# Patient Record
Sex: MALE | Race: WHITE | NOT HISPANIC OR LATINO | Employment: FULL TIME | URBAN - METROPOLITAN AREA
[De-identification: names, ages, dates, MRNs, and addresses within clinical notes are randomized per-mention and may not be internally consistent; named-entity substitution may affect disease eponyms.]

---

## 2017-11-12 ENCOUNTER — OFFICE VISIT (OUTPATIENT)
Dept: URGENT CARE | Facility: PHYSICIAN GROUP | Age: 22
End: 2017-11-12
Payer: COMMERCIAL

## 2017-11-12 VITALS
HEIGHT: 70 IN | HEART RATE: 95 BPM | TEMPERATURE: 99.3 F | RESPIRATION RATE: 16 BRPM | SYSTOLIC BLOOD PRESSURE: 146 MMHG | WEIGHT: 299 LBS | DIASTOLIC BLOOD PRESSURE: 84 MMHG | BODY MASS INDEX: 42.8 KG/M2 | OXYGEN SATURATION: 96 %

## 2017-11-12 DIAGNOSIS — J06.9 UPPER RESPIRATORY TRACT INFECTION, UNSPECIFIED TYPE: ICD-10-CM

## 2017-11-12 PROCEDURE — 99204 OFFICE O/P NEW MOD 45 MIN: CPT | Performed by: PHYSICIAN ASSISTANT

## 2017-11-12 RX ORDER — NAPROXEN 500 MG/1
TABLET ORAL
Qty: 30 TAB | Refills: 0 | Status: SHIPPED | OUTPATIENT
Start: 2017-11-12

## 2017-11-12 RX ORDER — DEXTROMETHORPHAN HYDROBROMIDE AND PROMETHAZINE HYDROCHLORIDE 15; 6.25 MG/5ML; MG/5ML
SYRUP ORAL
Qty: 180 ML | Refills: 0 | Status: SHIPPED | OUTPATIENT
Start: 2017-11-12

## 2017-11-12 RX ORDER — LISINOPRIL 20 MG/1
20 TABLET ORAL DAILY
COMMUNITY

## 2017-11-12 ASSESSMENT — ENCOUNTER SYMPTOMS
SHORTNESS OF BREATH: 0
WHEEZING: 0
CHILLS: 0
SWEATS: 0
HEADACHES: 1
SORE THROAT: 1
COUGH: 1
MYALGIAS: 0
PSYCHIATRIC NEGATIVE: 1
RHINORRHEA: 1
FEVER: 0
HEARTBURN: 0

## 2017-11-12 NOTE — PROGRESS NOTES
"Subjective:      Jose Luis Rojas is a 22 y.o. male who presents with Cough (x 2 weeks, congestion, sore throat x 4 days)            Cough   This is a new problem. The current episode started in the past 7 days. The problem has been unchanged. The problem occurs every few hours. The cough is productive of sputum. Associated symptoms include ear congestion, headaches, nasal congestion, postnasal drip, rhinorrhea and a sore throat. Pertinent negatives include no chest pain, chills, ear pain, fever, heartburn, myalgias, shortness of breath, sweats or wheezing. Nothing aggravates the symptoms. Risk factors for lung disease include smoking/tobacco exposure. He has tried OTC cough suppressant for the symptoms. The treatment provided mild relief. There is no history of asthma.     Past medical history: Is not pertinent to this examination  Past surgical history: Not pertinent to this examination  Family history: Is not pertinent to this examination  Allergies: No known drug allergies  Social history: Is reviewed in Epic    Review of Systems   Constitutional: Negative for chills and fever.   HENT: Positive for postnasal drip, rhinorrhea and sore throat. Negative for ear pain.    Respiratory: Positive for cough. Negative for shortness of breath and wheezing.    Cardiovascular: Negative for chest pain.   Gastrointestinal: Negative for heartburn.   Genitourinary: Negative.    Musculoskeletal: Negative for myalgias.   Skin: Negative.    Neurological: Positive for headaches.   Psychiatric/Behavioral: Negative.           Objective:     /84   Pulse 95   Temp 37.4 °C (99.3 °F)   Resp 16   Ht 1.778 m (5' 10\")   Wt (!) 135.6 kg (299 lb)   SpO2 96%   BMI 42.90 kg/m²      Physical Exam       Gen.: Patient is A and O ×3, no acute distress, well-nourished well-hydrated  Vitals: Are listed and unremarkable  HEENT: Heads normocephalic, atraumatic, PERRLA, extraocular movements intact, TMs and oropharynx clear  Neck: Soft " supple without cervical lymphadenopathy  Cardiovascular: Regular rate and rhythm normal S1 and S2. No murmurs, rubs or gallops  Lungs are clear to auscultation bilaterally. no wheezes rales or rhonchi  Abdomen is soft, nontender, nondistended with good bowel sounds, no hepatosplenomegaly  Skin: Is well perfused without evidence of rash or lesions  Neurological:  cranial nerves II through XII were assessed and intact.  Musculoskeletal: Full range of motion, 5 out of 5 strength against resistance  Neurovascularly: Intact with a 2 second cap refill, good distal pulses       Assessment/Plan:     1. Upper respiratory tract infection, unspecified type  Discussed likely viral etiology at length, splint care measures discussed. Offered a note for work. He defers. Follow-up if symptoms persist or worsen symmetric  - promethazine-dextromethorphan (PROMETHAZINE-DM) 6.25-15 MG/5ML syrup; Take 5mls every six hours as needed for cough  Dispense: 180 mL; Refill: 0  - naproxen (NAPROSYN) 500 MG Tab; Take one pill twice a days as needed with food  Dispense: 30 Tab; Refill: 0

## 2018-11-06 ENCOUNTER — OFFICE VISIT (OUTPATIENT)
Dept: URGENT CARE | Facility: PHYSICIAN GROUP | Age: 23
End: 2018-11-06
Payer: COMMERCIAL

## 2018-11-06 VITALS
TEMPERATURE: 98.2 F | WEIGHT: 281 LBS | SYSTOLIC BLOOD PRESSURE: 138 MMHG | BODY MASS INDEX: 40.23 KG/M2 | HEART RATE: 89 BPM | DIASTOLIC BLOOD PRESSURE: 92 MMHG | RESPIRATION RATE: 16 BRPM | OXYGEN SATURATION: 99 % | HEIGHT: 70 IN

## 2018-11-06 DIAGNOSIS — J06.9 UPPER RESPIRATORY TRACT INFECTION, UNSPECIFIED TYPE: ICD-10-CM

## 2018-11-06 PROCEDURE — 99213 OFFICE O/P EST LOW 20 MIN: CPT | Performed by: EMERGENCY MEDICINE

## 2018-11-06 RX ORDER — BENZONATATE 100 MG/1
100 CAPSULE ORAL 3 TIMES DAILY PRN
Qty: 60 CAP | Refills: 0 | Status: SHIPPED | OUTPATIENT
Start: 2018-11-06 | End: 2018-11-16

## 2018-11-06 ASSESSMENT — ENCOUNTER SYMPTOMS
COUGH: 1
SINUS PAIN: 1
EYE DISCHARGE: 0
EYE REDNESS: 0
MYALGIAS: 0
NAUSEA: 0
SPUTUM PRODUCTION: 1
WHEEZING: 0
PALPITATIONS: 0
SHORTNESS OF BREATH: 0
ABDOMINAL PAIN: 0
STRIDOR: 0
SENSORY CHANGE: 0
VOMITING: 0
DIARRHEA: 0
FEVER: 0
SORE THROAT: 0
CHILLS: 0

## 2018-11-06 NOTE — LETTER
November 6, 2018        Jose Luis Rojas  [address] [facility] [address]        Dear Jose Luis:    Please ask for the next 1-2 days of from work for medical reasons.    If you have any questions or concerns, please don't hesitate to call.        Sincerely,        Jude Farris M.D.    Electronically Signed

## 2018-11-06 NOTE — PROGRESS NOTES
"Subjective:      Jose Luis Rojas is a 23 y.o. male who presents with Cough (x1week )            HPI  Pt with a cough for the past month , no fever, chills  NVD. Pt has post nasal drainage foir the past few days is requesting 1-2 days off from work and a cough supressant.     PMH:  has no past medical history on file.  MEDS:   Current Outpatient Prescriptions:   •  lisinopril (PRINIVIL) 20 MG Tab, Take 20 mg by mouth every day., Disp: , Rfl:   •  promethazine-dextromethorphan (PROMETHAZINE-DM) 6.25-15 MG/5ML syrup, Take 5mls every six hours as needed for cough (Patient not taking: Reported on 11/6/2018), Disp: 180 mL, Rfl: 0  •  naproxen (NAPROSYN) 500 MG Tab, Take one pill twice a days as needed with food (Patient not taking: Reported on 11/6/2018), Disp: 30 Tab, Rfl: 0  ALLERGIES:   Allergies   Allergen Reactions   • Pcn [Penicillins]      As a child     SURGHX: History reviewed. No pertinent surgical history.  SOCHX:  reports that he has never smoked. He has never used smokeless tobacco. He reports that he does not drink alcohol.  FH: family history is not on file.  Review of Systems   Constitutional: Negative for chills and fever.   HENT: Positive for congestion and sinus pain. Negative for sore throat.    Eyes: Negative for discharge and redness.   Respiratory: Positive for cough and sputum production. Negative for shortness of breath, wheezing and stridor.    Cardiovascular: Negative for chest pain and palpitations.   Gastrointestinal: Negative for abdominal pain, diarrhea, nausea and vomiting.   Genitourinary: Negative.    Musculoskeletal: Negative for myalgias.   Skin: Negative for rash.   Neurological: Negative for sensory change.          Objective:     /92   Pulse 89   Temp 36.8 °C (98.2 °F) (Temporal)   Resp 16   Ht 1.778 m (5' 10\")   Wt (!) 127.5 kg (281 lb)   SpO2 99%   BMI 40.32 kg/m²      Physical Exam   Constitutional: He appears well-developed and well-nourished. No distress.   obese "   HENT:   Head: Normocephalic and atraumatic.   Right Ear: External ear normal.   Left Ear: External ear normal.   Post nasal drainage   Eyes: Right eye exhibits no discharge.   Cardiovascular: Normal rate.    Pulmonary/Chest: Effort normal and breath sounds normal.   Abdominal: He exhibits no distension. There is no tenderness.   Musculoskeletal: Normal range of motion. He exhibits no edema or tenderness.   Neurological: He is alert. Coordination normal.   Skin: Skin is warm. No rash noted.   Psychiatric: He has a normal mood and affect.   Vitals reviewed.              Assessment/Plan:     DX Bronchitis  Sinusitis       I am recommending the patient initiate/ continue hydration efforts including the use of a vaporizer/humidifier/ netti pot. I also recommend the pt, initiate Mucinex and Sudafed or Dayquil if not hypertensive. In addition the patient will initiate the prescribed prescription medication/s: Tessalon perles for the night. If the patient's condition exacerbates with worsening dysphagia, shortness of breath, uncontrolled fever, headache or chest pressure he/she will return immediately to the urgent care or go to  the emergency department for further evaluation.  Off work note for the next 1-2 days for medical reasons.  Jude Farris  .

## 2019-08-17 ENCOUNTER — HOSPITAL ENCOUNTER (OUTPATIENT)
Dept: RADIOLOGY | Facility: MEDICAL CENTER | Age: 24
End: 2019-08-17
Attending: NURSE PRACTITIONER
Payer: COMMERCIAL

## 2019-08-17 ENCOUNTER — OFFICE VISIT (OUTPATIENT)
Dept: URGENT CARE | Facility: PHYSICIAN GROUP | Age: 24
End: 2019-08-17
Payer: COMMERCIAL

## 2019-08-17 VITALS
TEMPERATURE: 97.7 F | HEIGHT: 70 IN | SYSTOLIC BLOOD PRESSURE: 152 MMHG | HEART RATE: 66 BPM | BODY MASS INDEX: 42.52 KG/M2 | RESPIRATION RATE: 15 BRPM | WEIGHT: 297 LBS | DIASTOLIC BLOOD PRESSURE: 104 MMHG | OXYGEN SATURATION: 98 %

## 2019-08-17 DIAGNOSIS — M79.672 FOOT PAIN, BILATERAL: ICD-10-CM

## 2019-08-17 DIAGNOSIS — M79.671 FOOT PAIN, BILATERAL: ICD-10-CM

## 2019-08-17 DIAGNOSIS — S96.911A RIGHT FOOT STRAIN, INITIAL ENCOUNTER: ICD-10-CM

## 2019-08-17 DIAGNOSIS — S96.912A STRAIN OF LEFT FOOT, INITIAL ENCOUNTER: ICD-10-CM

## 2019-08-17 PROCEDURE — 73630 X-RAY EXAM OF FOOT: CPT | Mod: RT

## 2019-08-17 PROCEDURE — 99214 OFFICE O/P EST MOD 30 MIN: CPT | Performed by: NURSE PRACTITIONER

## 2019-08-17 PROCEDURE — 73630 X-RAY EXAM OF FOOT: CPT | Mod: LT

## 2019-08-17 RX ORDER — IBUPROFEN 200 MG
200 TABLET ORAL EVERY 6 HOURS PRN
COMMUNITY

## 2019-08-17 ASSESSMENT — ENCOUNTER SYMPTOMS
NUMBNESS: 0
CONSTITUTIONAL NEGATIVE: 1
WEAKNESS: 0
NEUROLOGICAL NEGATIVE: 1
SENSORY CHANGE: 0
TINGLING: 0

## 2019-08-17 ASSESSMENT — PAIN SCALES - GENERAL: PAINLEVEL: 5=MODERATE PAIN

## 2019-08-17 NOTE — PROGRESS NOTES
Subjective:     Jose Luis Rojas is a 24 y.o. male who presents for Foot Problem (Bilateral foot pain x 1 -2 days)       Foot Problem   This is a new problem. The problem has been gradually worsening. Pertinent negatives include no numbness, rash or weakness.     Patient presents urgent care with reports of bilateral foot pain.  He states that 2 days ago he started to experience the top of his right foot 1 day after tripping on it. He states he was compensating by walking more in his left foot and now is also starting to feel similar pain at the left.  Reports a history of foot injury but was never evaluated.  Patient showed a picture where his right foot was swollen and bruised in the past.  Patient reports that injury healed without incident.  Prior therapy: Ibuprofen with no relief in the pain. Risk factors: Patient is on his feet a lot.    PMH:  has no past medical history on file.    MEDS:   Current Outpatient Medications:   •  ibuprofen (MOTRIN) 200 MG Tab, Take 200 mg by mouth every 6 hours as needed., Disp: , Rfl:   •  lisinopril (PRINIVIL) 20 MG Tab, Take 20 mg by mouth every day., Disp: , Rfl:   •  promethazine-dextromethorphan (PROMETHAZINE-DM) 6.25-15 MG/5ML syrup, Take 5mls every six hours as needed for cough (Patient not taking: Reported on 11/6/2018), Disp: 180 mL, Rfl: 0  •  naproxen (NAPROSYN) 500 MG Tab, Take one pill twice a days as needed with food (Patient not taking: Reported on 11/6/2018), Disp: 30 Tab, Rfl: 0    ALLERGIES:   Allergies   Allergen Reactions   • Pcn [Penicillins]      As a child     SURGHX: History reviewed. No pertinent surgical history.    SOCHX:  reports that he has never smoked. He has never used smokeless tobacco. He reports that he does not drink alcohol.     FH: Reviewed with patient, not pertinent to this visit.    Review of Systems   Constitutional: Negative.    Musculoskeletal:        Foot pain   Skin: Negative.  Negative for rash.   Neurological: Negative.  Negative  "for tingling, sensory change, weakness and numbness.   All other systems reviewed and are negative.    Objective:     /104   Pulse 66   Temp 36.5 °C (97.7 °F)   Resp 15   Ht 1.778 m (5' 10\")   Wt (!) 134.7 kg (297 lb)   SpO2 98%   BMI 42.62 kg/m²     Physical Exam   Constitutional: He is oriented to person, place, and time. He appears well-developed and well-nourished. He is cooperative.  Non-toxic appearance. No distress.   HENT:   Head: Normocephalic.   Right Ear: External ear normal.   Left Ear: External ear normal.   Nose: Nose normal.   Eyes: Conjunctivae and EOM are normal.   Neck: Normal range of motion.   Cardiovascular: Normal rate and intact distal pulses.   Pulses:       Dorsalis pedis pulses are 2+ on the right side, and 2+ on the left side.   Pulmonary/Chest: Effort normal. No respiratory distress.   Musculoskeletal: Normal range of motion. He exhibits no deformity.        Right foot: There is tenderness (Dorsal). There is normal range of motion, no swelling, normal capillary refill, no deformity and no laceration.        Left foot: There is tenderness (Dorsal). There is normal range of motion, no swelling, normal capillary refill, no deformity and no laceration.   Neurological: He is alert and oriented to person, place, and time. He has normal strength. No sensory deficit. Coordination normal.   Skin: Skin is warm and dry. Capillary refill takes less than 2 seconds. He is not diaphoretic. No pallor.   Psychiatric: He has a normal mood and affect. His behavior is normal.   Vitals reviewed.    X-ray of left foot:    Details     Reading Physician Reading Date Result Priority   John Mensah M.D. 8/17/2019 [facility]      Narrative       8/17/2019 12:07 PM    HISTORY/REASON FOR EXAM:  Atraumatic Pain/Swelling/Deformity  Atraumatic dorsal left foot pain x1 day.    TECHNIQUE/EXAM DESCRIPTION AND NUMBER OF VIEWS:  3 views of the LEFT foot.    COMPARISON:  8/17/2019.    FINDINGS:    No acute " fracture or dislocation.    No joint osteoarthritis.      Impression         No acute osseous abnormality.             Last Resulted: 08/17/19 12:22 PM        X-ray of right foot:    Details     Reading Physician Reading Date Result Priority   John Mensah M.D. [facility]/2019 [facility]      Narrative       8/17/2019 12:03 PM    HISTORY/REASON FOR EXAM:  Atraumatic Pain/Swelling/Deformity  Right dorsal foot pain.    TECHNIQUE/EXAM DESCRIPTION AND NUMBER OF VIEWS:  3 views of the RIGHT foot.    COMPARISON:  None.    FINDINGS:    No acute fracture or dislocation.    No joint osteoarthritis.    Well-corticated osseous fragment adjacent to the distal fibula.      Impression       No acute osseous abnormality.             Last Resulted: 08/17/19 12:21 PM           Assessment/Plan:     1. Foot pain, bilateral  - DX-FOOT-COMPLETE 3+ LEFT; Future  - DX-FOOT-COMPLETE 3+ RIGHT; Future    2. Right foot strain, initial encounter    3. Strain of left foot, initial encounter    X-ray of left and right foot ordered. Radiology report and images reviewed by myself. Per my interpretation: negative for acute process, no fracture or dislocation.     Likely strain.    Discussed RICE and OTC ibuprofen and/or acetaminophen PRN for pain.    Patient declines referral for sports medicine at this time.    Patient advised close monitoring for resolution of symptoms and to: Return for 1) Symptoms don't improve or worsen, or go to ER, 2) Follow up with primary care in 7-10 days.    Differential diagnosis, natural history, supportive care, and indications for immediate follow-up discussed. All questions answered. Patient agrees with the plan of care.

## 2022-02-23 ENCOUNTER — HOSPITAL ENCOUNTER (OUTPATIENT)
Facility: MEDICAL CENTER | Age: 27
End: 2022-02-23
Attending: PHYSICIAN ASSISTANT
Payer: COMMERCIAL

## 2022-02-23 LAB
APPEARANCE FLD: NORMAL
BODY FLD TYPE: NORMAL
COLOR FLD: YELLOW
CRYSTALS FLD MICRO: NORMAL
GRAM STN SPEC: NORMAL
LYMPHOCYTES NFR FLD: 4 %
MONONUC CELLS NFR FLD: 7 %
NEUTROPHILS NFR FLD: 89 %
RBC # FLD: 3000 CELLS/UL
SIGNIFICANT IND 70042: NORMAL
SITE SITE: NORMAL
SOURCE SOURCE: NORMAL
WBC # FLD: NORMAL CELLS/UL

## 2022-02-23 PROCEDURE — 87075 CULTR BACTERIA EXCEPT BLOOD: CPT

## 2022-02-23 PROCEDURE — 89060 EXAM SYNOVIAL FLUID CRYSTALS: CPT

## 2022-02-23 PROCEDURE — 89051 BODY FLUID CELL COUNT: CPT

## 2022-02-23 PROCEDURE — 87070 CULTURE OTHR SPECIMN AEROBIC: CPT

## 2022-02-23 PROCEDURE — 87205 SMEAR GRAM STAIN: CPT

## 2022-02-26 LAB
BACTERIA FLD AEROBE CULT: NORMAL
GRAM STN SPEC: NORMAL
SIGNIFICANT IND 70042: NORMAL
SITE SITE: NORMAL
SOURCE SOURCE: NORMAL

## 2022-02-28 LAB
BACTERIA SPEC ANAEROBE CULT: NORMAL
SIGNIFICANT IND 70042: NORMAL
SITE SITE: NORMAL
SOURCE SOURCE: NORMAL

## 2022-11-10 ENCOUNTER — HOSPITAL ENCOUNTER (OUTPATIENT)
Dept: LAB | Facility: MEDICAL CENTER | Age: 27
End: 2022-11-10
Attending: FAMILY MEDICINE
Payer: COMMERCIAL

## 2022-11-10 LAB
25(OH)D3 SERPL-MCNC: 21 NG/ML (ref 30–100)
ALBUMIN SERPL BCP-MCNC: 4.6 G/DL (ref 3.2–4.9)
ALBUMIN/GLOB SERPL: 1.7 G/DL
ALP SERPL-CCNC: 80 U/L (ref 30–99)
ALT SERPL-CCNC: 36 U/L (ref 2–50)
ANION GAP SERPL CALC-SCNC: 12 MMOL/L (ref 7–16)
AST SERPL-CCNC: 28 U/L (ref 12–45)
BILIRUB SERPL-MCNC: 0.3 MG/DL (ref 0.1–1.5)
BUN SERPL-MCNC: 19 MG/DL (ref 8–22)
CALCIUM SERPL-MCNC: 9.3 MG/DL (ref 8.5–10.5)
CHLORIDE SERPL-SCNC: 103 MMOL/L (ref 96–112)
CO2 SERPL-SCNC: 24 MMOL/L (ref 20–33)
CREAT SERPL-MCNC: 0.92 MG/DL (ref 0.5–1.4)
CRP SERPL HS-MCNC: 0.61 MG/DL (ref 0–0.75)
ERYTHROCYTE [SEDIMENTATION RATE] IN BLOOD BY WESTERGREN METHOD: 5 MM/HOUR (ref 0–20)
EST. AVERAGE GLUCOSE BLD GHB EST-MCNC: 123 MG/DL
GFR SERPLBLD CREATININE-BSD FMLA CKD-EPI: 116 ML/MIN/1.73 M 2
GLOBULIN SER CALC-MCNC: 2.7 G/DL (ref 1.9–3.5)
GLUCOSE SERPL-MCNC: 85 MG/DL (ref 65–99)
HBA1C MFR BLD: 5.9 % (ref 4–5.6)
POTASSIUM SERPL-SCNC: 4.1 MMOL/L (ref 3.6–5.5)
PROT SERPL-MCNC: 7.3 G/DL (ref 6–8.2)
SODIUM SERPL-SCNC: 139 MMOL/L (ref 135–145)
URATE SERPL-MCNC: 10.2 MG/DL (ref 2.5–8.3)

## 2022-11-10 PROCEDURE — 84550 ASSAY OF BLOOD/URIC ACID: CPT

## 2022-11-10 PROCEDURE — 36415 COLL VENOUS BLD VENIPUNCTURE: CPT

## 2022-11-10 PROCEDURE — 85652 RBC SED RATE AUTOMATED: CPT

## 2022-11-10 PROCEDURE — 80053 COMPREHEN METABOLIC PANEL: CPT

## 2022-11-10 PROCEDURE — 83036 HEMOGLOBIN GLYCOSYLATED A1C: CPT

## 2022-11-10 PROCEDURE — 86140 C-REACTIVE PROTEIN: CPT

## 2022-11-10 PROCEDURE — 82306 VITAMIN D 25 HYDROXY: CPT

## 2022-11-10 PROCEDURE — 86038 ANTINUCLEAR ANTIBODIES: CPT

## 2022-11-11 ENCOUNTER — HOSPITAL ENCOUNTER (OUTPATIENT)
Dept: LAB | Facility: MEDICAL CENTER | Age: 27
End: 2022-11-11
Attending: FAMILY MEDICINE
Payer: COMMERCIAL

## 2022-11-11 PROCEDURE — 86812 HLA TYPING A B OR C: CPT

## 2022-11-12 LAB — NUCLEAR IGG SER QL IA: NORMAL

## 2022-11-15 LAB — HLA-B27 QL FC: NEGATIVE

## 2025-07-01 ENCOUNTER — HOSPITAL ENCOUNTER (OUTPATIENT)
Dept: LAB | Facility: MEDICAL CENTER | Age: 30
End: 2025-07-01
Attending: FAMILY MEDICINE
Payer: COMMERCIAL

## 2025-07-01 LAB
ALBUMIN SERPL BCP-MCNC: 4.4 G/DL (ref 3.2–4.9)
ALBUMIN/GLOB SERPL: 1.2 G/DL
ALP SERPL-CCNC: 86 U/L (ref 30–99)
ALT SERPL-CCNC: 27 U/L (ref 2–50)
ANION GAP SERPL CALC-SCNC: 14 MMOL/L (ref 7–16)
AST SERPL-CCNC: 26 U/L (ref 12–45)
BASOPHILS # BLD AUTO: 0.7 % (ref 0–1.8)
BASOPHILS # BLD: 0.09 K/UL (ref 0–0.12)
BILIRUB SERPL-MCNC: 0.3 MG/DL (ref 0.1–1.5)
BUN SERPL-MCNC: 15 MG/DL (ref 8–22)
CALCIUM ALBUM COR SERPL-MCNC: 9.5 MG/DL (ref 8.5–10.5)
CALCIUM SERPL-MCNC: 9.8 MG/DL (ref 8.5–10.5)
CHLORIDE SERPL-SCNC: 102 MMOL/L (ref 96–112)
CHOLEST SERPL-MCNC: 198 MG/DL (ref 100–199)
CO2 SERPL-SCNC: 21 MMOL/L (ref 20–33)
CREAT SERPL-MCNC: 0.97 MG/DL (ref 0.5–1.4)
EOSINOPHIL # BLD AUTO: 0.38 K/UL (ref 0–0.51)
EOSINOPHIL NFR BLD: 3.1 % (ref 0–6.9)
ERYTHROCYTE [DISTWIDTH] IN BLOOD BY AUTOMATED COUNT: 40.8 FL (ref 35.9–50)
EST. AVERAGE GLUCOSE BLD GHB EST-MCNC: 128 MG/DL
FASTING STATUS PATIENT QL REPORTED: NORMAL
GFR SERPLBLD CREATININE-BSD FMLA CKD-EPI: 107 ML/MIN/1.73 M 2
GLOBULIN SER CALC-MCNC: 3.6 G/DL (ref 1.9–3.5)
GLUCOSE SERPL-MCNC: 88 MG/DL (ref 65–99)
HBA1C MFR BLD: 6.1 % (ref 4–5.6)
HCT VFR BLD AUTO: 50 % (ref 42–52)
HDLC SERPL-MCNC: 45 MG/DL
HGB BLD-MCNC: 16.2 G/DL (ref 14–18)
IMM GRANULOCYTES # BLD AUTO: 0.03 K/UL (ref 0–0.11)
IMM GRANULOCYTES NFR BLD AUTO: 0.2 % (ref 0–0.9)
LDLC SERPL CALC-MCNC: 130 MG/DL
LYMPHOCYTES # BLD AUTO: 3.98 K/UL (ref 1–4.8)
LYMPHOCYTES NFR BLD: 32.4 % (ref 22–41)
MCH RBC QN AUTO: 28 PG (ref 27–33)
MCHC RBC AUTO-ENTMCNC: 32.4 G/DL (ref 32.3–36.5)
MCV RBC AUTO: 86.4 FL (ref 81.4–97.8)
MONOCYTES # BLD AUTO: 1.16 K/UL (ref 0–0.85)
MONOCYTES NFR BLD AUTO: 9.4 % (ref 0–13.4)
NEUTROPHILS # BLD AUTO: 6.65 K/UL (ref 1.82–7.42)
NEUTROPHILS NFR BLD: 54.2 % (ref 44–72)
NRBC # BLD AUTO: 0 K/UL
NRBC BLD-RTO: 0 /100 WBC (ref 0–0.2)
PLATELET # BLD AUTO: 503 K/UL (ref 164–446)
PMV BLD AUTO: 9.7 FL (ref 9–12.9)
POTASSIUM SERPL-SCNC: 4.6 MMOL/L (ref 3.6–5.5)
PROT SERPL-MCNC: 8 G/DL (ref 6–8.2)
RBC # BLD AUTO: 5.79 M/UL (ref 4.7–6.1)
SODIUM SERPL-SCNC: 137 MMOL/L (ref 135–145)
TRIGL SERPL-MCNC: 116 MG/DL (ref 0–149)
TSH SERPL-ACNC: 1.13 UIU/ML (ref 0.38–5.33)
WBC # BLD AUTO: 12.3 K/UL (ref 4.8–10.8)

## 2025-07-01 PROCEDURE — 84403 ASSAY OF TOTAL TESTOSTERONE: CPT

## 2025-07-01 PROCEDURE — 85025 COMPLETE CBC W/AUTO DIFF WBC: CPT

## 2025-07-01 PROCEDURE — 84402 ASSAY OF FREE TESTOSTERONE: CPT

## 2025-07-01 PROCEDURE — 80053 COMPREHEN METABOLIC PANEL: CPT

## 2025-07-01 PROCEDURE — 80061 LIPID PANEL: CPT

## 2025-07-01 PROCEDURE — 83036 HEMOGLOBIN GLYCOSYLATED A1C: CPT

## 2025-07-01 PROCEDURE — 84443 ASSAY THYROID STIM HORMONE: CPT

## 2025-07-01 PROCEDURE — 84270 ASSAY OF SEX HORMONE GLOBUL: CPT

## 2025-07-01 PROCEDURE — 36415 COLL VENOUS BLD VENIPUNCTURE: CPT

## 2025-07-03 LAB
SHBG SERPL-SCNC: 25 NMOL/L (ref 17–56)
TESTOST FREE MFR SERPL: 2.1 % (ref 1.6–2.9)
TESTOST FREE SERPL-MCNC: 75 PG/ML (ref 47–244)
TESTOST SERPL-MCNC: 357 NG/DL (ref 300–1080)